# Patient Record
Sex: MALE | Race: WHITE | NOT HISPANIC OR LATINO | Employment: FULL TIME | URBAN - METROPOLITAN AREA
[De-identification: names, ages, dates, MRNs, and addresses within clinical notes are randomized per-mention and may not be internally consistent; named-entity substitution may affect disease eponyms.]

---

## 2017-01-14 DIAGNOSIS — I10 HTN (HYPERTENSION), BENIGN: ICD-10-CM

## 2017-01-16 RX ORDER — LISINOPRIL AND HYDROCHLOROTHIAZIDE 20; 12.5 MG/1; MG/1
TABLET ORAL
Qty: 90 TABLET | Refills: 1 | Status: SHIPPED | OUTPATIENT
Start: 2017-01-16

## 2017-03-13 ENCOUNTER — HOSPITAL ENCOUNTER (OUTPATIENT)
Dept: SLEEP MEDICINE | Facility: HOSPITAL | Age: 57
Discharge: HOME | End: 2017-03-13
Attending: INTERNAL MEDICINE | Admitting: INTERNAL MEDICINE

## 2017-03-13 PROCEDURE — G0463 HOSPITAL OUTPT CLINIC VISIT: HCPCS

## 2017-03-14 NOTE — PROGRESS NOTES
DATE OF SERVICE: 03/13/2017    SLEEP MEDICINE FOLLOWUP    PROBLEM LIST:  1.  Obstructive sleep apnea, moderate based on home sleep study that was done 01/08/2016 with AHI of 17.7 with supine component with AHI of 27.4.   2.  Hypertension, controlled on medication.   3.  Hyperlipidemia.   4.  Hypersomnia that is improved on CPAP.      HISTORY OF PRESENT ILLNESS: The patient is here for followup  and was last seen on 03/14/2016. A home sleep study in January 2016 was positive for moderate sleep apnea. Given the presence of his symptoms and severity, treatment was indicated and he was started on auto CPAP of 6-20 that was later titrated to 8-14 for more optimal control. He did have complaints of excessive daytime sleepiness.  Had original [place] Sleepiness Scale of 15 that has improved down to 4 with the use of CPAP. He does have a history of hypertension and hyperlipidemia that is well controlled on medications. He feels better while on the CPAP with no complaints with the mask or pressure. He reports that he has had some recent travel visiting his family and has ended up missing several nights on his machine and has been a bit noncompliant, but is planning to get back to using it regularly. There are no changes in medical, surgical, social or family history since last visit.     MEDICATIONS:   1.  Lisinopril/hydrochlorothiazide 20/12.5 mg daily.    2.  Nexium 40 mg daily.    Allergies: No known drug allergies.      SOCIAL HISTORY: Denies tobacco use. Does drink about 2 alcoholic beverages per week and is still drinking about 4 caffeine beverages per day.    REVIEW OF SYSTEMS: A 10-point review of systems was reviewed and negative except for nasal congestion and acid reflux/heartburn. Please refer to page 1 of followup sleep questionnaire for further information. [place] Sleepiness Scale of 9.    PHYSICAL EXAMINATION:  VITAL SIGNS: Height 6 feet 0 inches, weight 200 which is up 3 pounds since last visit, BMI of 26,  blood pressure 154/95 which is up from 110/70 on last visit, and heart rate 58. GENERAL: The patient is alert and oriented x3. No acute distress. Normal mood and affect.   HEENT: Has normal conjunctivae,  PERRLA. He has moist mucous membranes with midline nasal septum. Large tonsils with redundant membranes, soft palate, and Mallampati IV airway.   LUNGS: Nonlabored, clear to auscultation bilaterally. No wheezing or crackles.   HEART: Regular rate and rhythm. Normal S1, S2, with no murmur, rub, or gallop.   EXTREMITIES: No edema. No clubbing.  No cyanosis. Normal gait.     DIAGNOSTIC DATA: Home sleep study was done on 01/08/2016, showed moderate obstructive sleep apnea with AHI of 17.7 and RDI of 19.7 with AHI of 27.4 in the supine position. Total sleep time of 3.5 minutes was spent in the hypoxemic range. [place] Sleepiness Scale was originally 15 and improved down to 4 with CPAP use and is now back up to 9. Compliance download from today, 03/13/2017, shows 53.3% compliance, which patient reports is due to his lack of use during recent travels. He gets an average of 4 hours 46 minutes on days used and on all days an average of 2 hours 32 minutes overall. He has a residual AHI of 2.8 on auto CPAP of 8-14 with a mean of 8.5 and a peak of 9.5 with 90% of the time spent at 9.5. Large air leak of 11 seconds.     ASSESSMENT:   1.  Moderate obstructive sleep apnea, improved when using CPAP; however, compliance is down from 97% to 53%.   2.  Hypertension. His blood pressure was a little uncontrolled today, up to 154/95, and patient reports that he has not taken his medications for a couple of days. He was encouraged to take his medications as instructed.   3.  Excessive daytime sleepiness/hypersomnia that is usually better on CPAP. He had initial ESS of 15, that improved down to 4 while on CPAP, and now is back up to 9, which may be due to his noncompliance.   4.  Hyperlipidemia.  Not on any medications at this time.   5.   Overweight with a BMI of 26.      PLAN:  1.  Patient was reeducated about the risks of obstructive sleep apnea and cardiovascular complications including hypertension, hyperlipidemia, stroke, CHF, coronary artery disease. Patient was educated on how weight can improve obstructive sleep apnea symptoms.   2.  Will order patient new CPAP supplies to help improve with compliance. Patient has agreed that he will be more compliant using his machine.   3.  Patient will check with primary care provider regarding starting a statin for his LDL of 176 and increased risk of cardiovascular events with prior history of obstructive sleep apnea and hypertension that is uncontrolled today.   4.  Patient will resume blood pressure medications and take it regularly. He will continue to monitor his blood pressure, and if it stays elevated will follow up with his primary care provider for further blood pressure medications.   5.  Patient will follow up in 2 months to discuss compliance and recheck blood pressure.     JASBIR Ahuja, dictating for MD Ana Christie APRN  WF:tp  D:   03/13/2017 16:52:58  T:   03/14/2017 06:21:15  Job ID:   [id number]  Document ID:   [id number]  cc:

## 2017-05-22 ENCOUNTER — APPOINTMENT (OUTPATIENT)
Dept: SLEEP MEDICINE | Facility: HOSPITAL | Age: 57
End: 2017-05-22

## 2021-01-27 ENCOUNTER — HOSPITAL ENCOUNTER (OUTPATIENT)
Dept: GASTROENTEROLOGY | Facility: HOSPITAL | Age: 61
Discharge: HOME | End: 2021-01-27
Attending: NURSE PRACTITIONER